# Patient Record
Sex: MALE | Race: BLACK OR AFRICAN AMERICAN | NOT HISPANIC OR LATINO
[De-identification: names, ages, dates, MRNs, and addresses within clinical notes are randomized per-mention and may not be internally consistent; named-entity substitution may affect disease eponyms.]

---

## 2019-06-28 PROBLEM — Z00.00 ENCOUNTER FOR PREVENTIVE HEALTH EXAMINATION: Status: ACTIVE | Noted: 2019-06-28

## 2021-02-05 ENCOUNTER — APPOINTMENT (OUTPATIENT)
Dept: PULMONOLOGY | Facility: CLINIC | Age: 51
End: 2021-02-05
Payer: COMMERCIAL

## 2021-02-05 VITALS
TEMPERATURE: 98.1 F | SYSTOLIC BLOOD PRESSURE: 116 MMHG | OXYGEN SATURATION: 97 % | DIASTOLIC BLOOD PRESSURE: 81 MMHG | WEIGHT: 214.13 LBS | BODY MASS INDEX: 31.72 KG/M2 | HEIGHT: 69 IN | HEART RATE: 82 BPM

## 2021-02-05 DIAGNOSIS — F17.200 NICOTINE DEPENDENCE, UNSPECIFIED, UNCOMPLICATED: ICD-10-CM

## 2021-02-05 DIAGNOSIS — F51.12 INSUFFICIENT SLEEP SYNDROME: ICD-10-CM

## 2021-02-05 DIAGNOSIS — K21.9 GASTRO-ESOPHAGEAL REFLUX DISEASE W/OUT ESOPHAGITIS: ICD-10-CM

## 2021-02-05 DIAGNOSIS — F17.210 NICOTINE DEPENDENCE, CIGARETTES, UNCOMPLICATED: ICD-10-CM

## 2021-02-05 PROCEDURE — 99406 BEHAV CHNG SMOKING 3-10 MIN: CPT

## 2021-02-05 PROCEDURE — 99072 ADDL SUPL MATRL&STAF TM PHE: CPT

## 2021-02-05 PROCEDURE — 99204 OFFICE O/P NEW MOD 45 MIN: CPT | Mod: 25

## 2021-02-05 RX ORDER — OMEPRAZOLE MAGNESIUM 20 MG/1
20 CAPSULE, DELAYED RELEASE ORAL
Refills: 0 | Status: ACTIVE | COMMUNITY

## 2021-02-05 NOTE — HISTORY OF PRESENT ILLNESS
[FreeTextEntry1] : 02/05/2021 :  ISAIAH ESCOBAR is a 50 year male who is here for evaluation of sleep apnea.  Tells me first dx'ed with obstructive sleep apnea about 15 yrs ago, on CPAP since, two further studies, CPAP reset.  Current CPAP about 2 years old. Currently tells me he is not feeling as refreshed in AM as he had been, family tells him he is snoring.  Very dificult sleep schedule, works long shifts with long commmute, generally sleeps 11 pm to 430 am at best, one wake.   for [employer]. Frequent AM headache, wakes with nasal congestion. \par \par Has had ENT surgery for nasal congestion, uses fluticasone sometimes, starting saline lavage.  Since first dx'ed lost about 15 lbs. \par \par Smokes 1 p/d since age 14.  Denies wheeze, dyspnea, cough.  Had  bronchitis in past, not for >10 yrs.

## 2021-02-05 NOTE — ASSESSMENT
[FreeTextEntry1] : obstructive sleep apnea by hx\par sleep deprived\par smoker\par chronic rhinitis\par \par Will get download from durable medical equipment provider ([facility])- likely inadequate rx based on observed snoring.  Also likely excessive daytime somnolence from insufficient sleep time. \par \par >30 pk yr hx but below usual age for lung ca screening, no sx.  Smoking cessation discussed at length with patient.  Options include nicotine replacement with patch, gum or lozenge or use of medication to reduce cravings (Chantix). Smoking cessation programs may be useful. \par \par Rx of chronic rhinitis may help: quit smoking, use nasal steroids, saline lavage\par \par Will f/u when get download from durable medical equipment provider

## 2021-02-05 NOTE — PHYSICAL EXAM
[General Appearance - Well Developed] : well developed [Normal Appearance] : normal appearance [Well Groomed] : well groomed [General Appearance - Well Nourished] : well nourished [No Deformities] : no deformities [General Appearance - In No Acute Distress] : no acute distress [Normal Conjunctiva] : the conjunctiva exhibited no abnormalities [Eyelids - No Xanthelasma] : the eyelids demonstrated no xanthelasmas [Low Lying Soft Palate] : low lying soft palate [Erythema] : erythema of the pharynx [IV] : IV [FreeTextEntry1] : crowded, edematous appearing uvula [Neck Appearance] : the appearance of the neck was normal [Neck Cervical Mass (___cm)] : no neck mass was observed [Jugular Venous Distention Increased] : there was no jugular-venous distention [Thyroid Diffuse Enlargement] : the thyroid was not enlarged [Thyroid Nodule] : there were no palpable thyroid nodules [Heart Rate And Rhythm] : heart rate was normal and rhythm regular [Heart Sounds] : normal S1 and S2 [Heart Sounds Gallop] : no gallops [Murmurs] : no murmurs [Heart Sounds Pericardial Friction Rub] : no pericardial rub [Auscultation Breath Sounds / Voice Sounds] : lungs were clear to auscultation bilaterally [Abnormal Walk] : normal gait [Musculoskeletal - Swelling] : no joint swelling seen [Motor Tone] : muscle strength and tone were normal [Nail Clubbing] : no clubbing of the fingernails [Cyanosis, Localized] : no localized cyanosis [Petechial Hemorrhages (___cm)] : no petechial hemorrhages [Skin Color & Pigmentation] : normal skin color and pigmentation [Skin Turgor] : normal skin turgor [] : no rash [Oriented To Time, Place, And Person] : oriented to person, place, and time [Impaired Insight] : insight and judgment were intact [Affect] : the affect was normal

## 2021-02-07 ENCOUNTER — FORM ENCOUNTER (OUTPATIENT)
Age: 51
End: 2021-02-07

## 2021-02-19 ENCOUNTER — APPOINTMENT (OUTPATIENT)
Dept: PULMONOLOGY | Facility: CLINIC | Age: 51
End: 2021-02-19
Payer: COMMERCIAL

## 2021-02-19 ENCOUNTER — APPOINTMENT (OUTPATIENT)
Dept: PULMONOLOGY | Facility: CLINIC | Age: 51
End: 2021-02-19

## 2021-02-19 DIAGNOSIS — G47.33 OBSTRUCTIVE SLEEP APNEA (ADULT) (PEDIATRIC): ICD-10-CM

## 2021-02-19 DIAGNOSIS — J31.0 CHRONIC RHINITIS: ICD-10-CM

## 2021-02-19 PROCEDURE — 99213 OFFICE O/P EST LOW 20 MIN: CPT | Mod: 95

## 2021-02-19 NOTE — ASSESSMENT
[FreeTextEntry1] : download shows variable but often  inadequate treatment on fixed pressure. Have sent rx to durable medical equipment provider ([facility]) to change to auto PAP if possible, f/u one month.  also advised him to restart fluticasone nasal spray for at least next month.  Might benefit from ENT eval

## 2021-02-19 NOTE — REASON FOR VISIT
[Home] : at home, [unfilled] , at the time of the visit. [Medical Office: ([facility])___] : at the medical office located in  [Verbal consent obtained from patient] : the patient, [unfilled]

## 2021-02-19 NOTE — HISTORY OF PRESENT ILLNESS
[FreeTextEntry1] : 02/05/2021 :  ISAIAH ESCOBAR is a 50 year male who is here for evaluation of sleep apnea.  Tells me first dx'ed with obstructive sleep apnea about 15 yrs ago, on CPAP since, two further studies, CPAP reset.  Current CPAP about 2 years old. Currently tells me he is not feeling as refreshed in AM as he had been, family tells him he is snoring.  Very dificult sleep schedule, works long shifts with long commmute, generally sleeps 11 pm to 430 am at best, one wake.   for [employer]. Frequent AM headache, wakes with nasal congestion. \par \par Has had ENT surgery for nasal congestion, uses fluticasone sometimes, starting saline lavage.  Since first dx'ed lost about 15 lbs. \par \par Smokes 1 p/d since age 14.  Denies wheeze, dyspnea, cough.  Had  bronchitis in past, not for >10 yrs. \par \par 2/19/21:  reviewed download from CPAP for past 30 d- compliance fair, 21/30 d, average Apnea Hypopnea Index is 15.3 with CPAP set at 9.5.   Most days good, some days AHI high 20s.  Patient relates this to days when he has bad nasal congestion.

## 2021-04-09 ENCOUNTER — APPOINTMENT (OUTPATIENT)
Dept: PULMONOLOGY | Facility: CLINIC | Age: 51
End: 2021-04-09

## 2021-06-01 ENCOUNTER — TRANSCRIPTION ENCOUNTER (OUTPATIENT)
Age: 51
End: 2021-06-01

## 2021-06-22 ENCOUNTER — NON-APPOINTMENT (OUTPATIENT)
Age: 51
End: 2021-06-22

## 2023-03-28 ENCOUNTER — APPOINTMENT (OUTPATIENT)
Dept: PULMONOLOGY | Facility: CLINIC | Age: 53
End: 2023-03-28

## 2024-03-22 ENCOUNTER — EMERGENCY (EMERGENCY)
Facility: HOSPITAL | Age: 54
LOS: 1 days | Discharge: HOME | End: 2024-03-22
Attending: EMERGENCY MEDICINE | Admitting: EMERGENCY MEDICINE
Payer: COMMERCIAL

## 2024-03-22 VITALS
HEART RATE: 86 BPM | DIASTOLIC BLOOD PRESSURE: 86 MMHG | OXYGEN SATURATION: 95 % | TEMPERATURE: 99 F | SYSTOLIC BLOOD PRESSURE: 132 MMHG | RESPIRATION RATE: 18 BRPM

## 2024-03-22 LAB
ALBUMIN SERPL ELPH-MCNC: 4.5 G/DL — SIGNIFICANT CHANGE UP (ref 3.3–5)
ALP SERPL-CCNC: 79 U/L — SIGNIFICANT CHANGE UP (ref 40–120)
ALT FLD-CCNC: 32 U/L — SIGNIFICANT CHANGE UP (ref 4–41)
ANION GAP SERPL CALC-SCNC: 11 MMOL/L — SIGNIFICANT CHANGE UP (ref 7–14)
AST SERPL-CCNC: 25 U/L — SIGNIFICANT CHANGE UP (ref 4–40)
BASOPHILS # BLD AUTO: 0.05 K/UL — SIGNIFICANT CHANGE UP (ref 0–0.2)
BASOPHILS NFR BLD AUTO: 0.7 % — SIGNIFICANT CHANGE UP (ref 0–2)
BILIRUB SERPL-MCNC: 0.4 MG/DL — SIGNIFICANT CHANGE UP (ref 0.2–1.2)
BUN SERPL-MCNC: 15 MG/DL — SIGNIFICANT CHANGE UP (ref 7–23)
CALCIUM SERPL-MCNC: 9.6 MG/DL — SIGNIFICANT CHANGE UP (ref 8.4–10.5)
CHLORIDE SERPL-SCNC: 102 MMOL/L — SIGNIFICANT CHANGE UP (ref 98–107)
CO2 SERPL-SCNC: 25 MMOL/L — SIGNIFICANT CHANGE UP (ref 22–31)
CREAT SERPL-MCNC: 0.96 MG/DL — SIGNIFICANT CHANGE UP (ref 0.5–1.3)
D DIMER BLD IA.RAPID-MCNC: <150 NG/ML DDU — SIGNIFICANT CHANGE UP
EGFR: 93 ML/MIN/1.73M2 — SIGNIFICANT CHANGE UP
EOSINOPHIL # BLD AUTO: 0.39 K/UL — SIGNIFICANT CHANGE UP (ref 0–0.5)
EOSINOPHIL NFR BLD AUTO: 5.2 % — SIGNIFICANT CHANGE UP (ref 0–6)
FLUAV AG NPH QL: SIGNIFICANT CHANGE UP
FLUBV AG NPH QL: SIGNIFICANT CHANGE UP
GLUCOSE SERPL-MCNC: 108 MG/DL — HIGH (ref 70–99)
HCT VFR BLD CALC: 44.8 % — SIGNIFICANT CHANGE UP (ref 39–50)
HGB BLD-MCNC: 15.7 G/DL — SIGNIFICANT CHANGE UP (ref 13–17)
IANC: 4.88 K/UL — SIGNIFICANT CHANGE UP (ref 1.8–7.4)
IMM GRANULOCYTES NFR BLD AUTO: 0.3 % — SIGNIFICANT CHANGE UP (ref 0–0.9)
LYMPHOCYTES # BLD AUTO: 1.37 K/UL — SIGNIFICANT CHANGE UP (ref 1–3.3)
LYMPHOCYTES # BLD AUTO: 18.4 % — SIGNIFICANT CHANGE UP (ref 13–44)
MCHC RBC-ENTMCNC: 29.2 PG — SIGNIFICANT CHANGE UP (ref 27–34)
MCHC RBC-ENTMCNC: 35 GM/DL — SIGNIFICANT CHANGE UP (ref 32–36)
MCV RBC AUTO: 83.3 FL — SIGNIFICANT CHANGE UP (ref 80–100)
MONOCYTES # BLD AUTO: 0.74 K/UL — SIGNIFICANT CHANGE UP (ref 0–0.9)
MONOCYTES NFR BLD AUTO: 9.9 % — SIGNIFICANT CHANGE UP (ref 2–14)
NEUTROPHILS # BLD AUTO: 4.88 K/UL — SIGNIFICANT CHANGE UP (ref 1.8–7.4)
NEUTROPHILS NFR BLD AUTO: 65.5 % — SIGNIFICANT CHANGE UP (ref 43–77)
NRBC # BLD: 0 /100 WBCS — SIGNIFICANT CHANGE UP (ref 0–0)
NRBC # FLD: 0.02 K/UL — HIGH (ref 0–0)
PLATELET # BLD AUTO: 165 K/UL — SIGNIFICANT CHANGE UP (ref 150–400)
POTASSIUM SERPL-MCNC: 4.2 MMOL/L — SIGNIFICANT CHANGE UP (ref 3.5–5.3)
POTASSIUM SERPL-SCNC: 4.2 MMOL/L — SIGNIFICANT CHANGE UP (ref 3.5–5.3)
PROT SERPL-MCNC: 7.5 G/DL — SIGNIFICANT CHANGE UP (ref 6–8.3)
RBC # BLD: 5.38 M/UL — SIGNIFICANT CHANGE UP (ref 4.2–5.8)
RBC # FLD: 13.2 % — SIGNIFICANT CHANGE UP (ref 10.3–14.5)
RSV RNA NPH QL NAA+NON-PROBE: SIGNIFICANT CHANGE UP
SARS-COV-2 RNA SPEC QL NAA+PROBE: SIGNIFICANT CHANGE UP
SODIUM SERPL-SCNC: 138 MMOL/L — SIGNIFICANT CHANGE UP (ref 135–145)
TROPONIN T, HIGH SENSITIVITY RESULT: 10 NG/L — SIGNIFICANT CHANGE UP
WBC # BLD: 7.45 K/UL — SIGNIFICANT CHANGE UP (ref 3.8–10.5)
WBC # FLD AUTO: 7.45 K/UL — SIGNIFICANT CHANGE UP (ref 3.8–10.5)

## 2024-03-22 PROCEDURE — 71046 X-RAY EXAM CHEST 2 VIEWS: CPT | Mod: 26

## 2024-03-22 PROCEDURE — 99284 EMERGENCY DEPT VISIT MOD MDM: CPT

## 2024-03-22 PROCEDURE — 93010 ELECTROCARDIOGRAM REPORT: CPT

## 2024-03-22 RX ORDER — AZITHROMYCIN 500 MG/1
500 TABLET, FILM COATED ORAL ONCE
Refills: 0 | Status: COMPLETED | OUTPATIENT
Start: 2024-03-22 | End: 2024-03-22

## 2024-03-22 RX ORDER — AZITHROMYCIN 500 MG/1
1 TABLET, FILM COATED ORAL
Qty: 4 | Refills: 0
Start: 2024-03-22 | End: 2024-03-25

## 2024-03-22 RX ORDER — IPRATROPIUM/ALBUTEROL SULFATE 18-103MCG
3 AEROSOL WITH ADAPTER (GRAM) INHALATION ONCE
Refills: 0 | Status: COMPLETED | OUTPATIENT
Start: 2024-03-22 | End: 2024-03-22

## 2024-03-22 RX ORDER — FLUTICASONE PROPIONATE 50 MCG
1 SPRAY, SUSPENSION NASAL ONCE
Refills: 0 | Status: DISCONTINUED | OUTPATIENT
Start: 2024-03-22 | End: 2024-03-26

## 2024-03-22 RX ADMIN — Medication 3 MILLILITER(S): at 13:35

## 2024-03-22 RX ADMIN — AZITHROMYCIN 500 MILLIGRAM(S): 500 TABLET, FILM COATED ORAL at 15:45

## 2024-03-22 NOTE — ED ADULT NURSE NOTE - NSFALLUNIVINTERV_ED_ALL_ED
Bed/Stretcher in lowest position, wheels locked, appropriate side rails in place/Call bell, personal items and telephone in reach/Instruct patient to call for assistance before getting out of bed/chair/stretcher/Non-slip footwear applied when patient is off stretcher/[place] to call system/Physically safe environment - no spills, clutter or unnecessary equipment/Purposeful proactive rounding/Room/bathroom lighting operational, light cord in reach

## 2024-03-22 NOTE — ED PROVIDER NOTE - OBJECTIVE STATEMENT
The patient is a 54 y/o M with past medical history of GERD, DELANEY on CPAP presenting with 1 week of cough, nasal congestion, headaches, and chest pain. States the chest pain is mostly present while he is coughing and states it feels like a tightness in his chest. Also endorsing increased dyspnea at rest and feels like his nightly CPAP is not helping. Bringing up small amount of white phlegm with the cough. Has been taking motrin which provides mild relief of the headache and chest tightness, last took last night. Endorses smoking 1 ppd of cigarettes. Notes the symptoms feel similar to prior occurrences of bronchitis for which duonebs  have helped in the past. Denies any fever, chills, abdominal pain, nausea, vomiting, diarrhea, or any other symptoms.

## 2024-03-22 NOTE — ED ADULT NURSE NOTE - NSFALLRISKASMT_ED_ALL_ED_DT
Call added to anticoag encounter.   Hazard triage, please address pt question about covid vaccine as below.   Thank you     22-Mar-2024 13:59

## 2024-03-22 NOTE — ED ADULT NURSE NOTE - OBJECTIVE STATEMENT
Patient is a 52 yo male, phx GERD, presenting with cough and chest pain x 1 week. A&Ox4, no signs of respiratory distress, lung sounds clear, duoneb in progress. Reports chest pain only when coughing. Denies fever. 20G PIV placed to RFA, labs sent per orders. Pending CXR. Fall precautions maintained.

## 2024-03-22 NOTE — ED PROVIDER NOTE - IV ALTEPLASE DOOR HIDDEN
Will continue current insulin regimen for now. Will continue monitoring  blood sugars, will Follow up. show

## 2024-03-22 NOTE — ED PROVIDER NOTE - NSFOLLOWUPINSTRUCTIONS_ED_ALL_ED_FT
Please take the antibiotic sent to your pharmacy as prescribed.     Take motrin and/or tylenol as needed for the symptoms.     Use flonase as needed for symptoms.    Follow up with your primary care provider within the next week.      Upper Respiratory Infection    A respiratory infection is an illness that affects parts of the body used for breathing, like the lungs, nose, and throat. Symptoms can include runny nose, coughing, sneezing, fatigue, body aches, sore throat, fever, or headache. Over the counter medicine can be used to manage the symptoms but the infection typically goes away on its own in 5 to 10 days.     SEEK IMMEDIATE MEDICAL CARE IF YOU HAVE ANY OF THE FOLLOWING SYMPTOMS: severe shortness of breath, chest pain, fever over 10 days, or lightheadedness/dizziness.

## 2024-03-22 NOTE — ED ADULT TRIAGE NOTE - CHIEF COMPLAINT QUOTE
pt with co cough /congestion ans discomfort to chest with coughing x one week. Pt with no fever in triage.

## 2024-03-22 NOTE — ED PROVIDER NOTE - CLINICAL SUMMARY MEDICAL DECISION MAKING FREE TEXT BOX
FERMÍN Perkins PGY1- patient here with 1 week cough, congestion, chest tightness, headaches, mild dyspnea at rest, feels like prior episodes of bronchitis, mild relief with motrin; feels nightly CPAP not helping dyspnea. Vitals WNL, diffuse rhonchi on exam, possible expiratory wheezing though patient coughing during expiratory phase so somewhat difficult to tell. ddx including viral uri, pneumonia, bronchitis, less likely but still consider ACS. Will obtain labs including troponin, ekg, CXR, nasal swab, give duoneb for symptoms and reassess.

## 2024-03-22 NOTE — ED PROVIDER NOTE - PATIENT PORTAL LINK FT
You can access the FollowMyHealth Patient Portal offered by [facility] by registering at the following website: [email or web address]. By joining [facility]’s FollowMyHealth portal, you will also be able to view your health information using other applications (apps) compatible with our system.

## 2024-03-22 NOTE — ED PROVIDER NOTE - PHYSICAL EXAMINATION
General: no acute distress  Psych: mood appropriate  Head: normocephalic; atraumatic  Eyes: conjunctivae clear bilaterally, sclerae anicteric  ENT: no nasal flaring, patent nares  Cardio: regular rate and rhythm; normal heart sounds  Resp: diffuse rhonchi, ?expiratory wheezing (unclear as patient coughing during expiration); no tachypnea or increased work of breathing  GI: abdomen soft, nontender, nondistended  Neuro: A&Ox3  Skin: no rashes or bruising noted  MSK: normal movement of extremities  Lymph/Vasc: no LE edema

## 2024-03-22 NOTE — ED PROVIDER NOTE - PROGRESS NOTE DETAILS
FERMÍN Perkins PGY1- no wheezing after duoneb, patient with some relief. Vitals WNL, will order dose of azithromycin here, send rest to pharmacy and give flonase. NM home

## 2024-03-22 NOTE — ED PROVIDER NOTE - ATTENDING CONTRIBUTION TO CARE
53M with h/o DELANEY on CPAP, smoking (1 pack of cigarettes per day), GERD p/w URI symptoms including cough, nasal congestion, chest pain and SOB x few days, now with episode of acute SOB when trying to sleep with CPAP machine last night. Pt was with his daughter this past weekend, who also had URI symptoms. Pt denies fever, abd pain, n/v/c/d, BLE pain/swelling, or other complaints. On exam, he has rhonchi throughout the lung fields. He is well-appearing without increased WOB or acute respiratory distress. No focal neuro deficits.    DDX/PLAN: Ddx includes PNA, viral syndrome, bronchitis, ACS, with lower suspicion for PE or dissection. Will obtain troponin, EKG, CXR, labs and give nebulizer treatment. Dispo pending workup.

## 2025-09-05 ENCOUNTER — EMERGENCY (EMERGENCY)
Facility: HOSPITAL | Age: 55
LOS: 1 days | End: 2025-09-05
Attending: PERSONAL EMERGENCY RESPONSE ATTENDANT | Admitting: PERSONAL EMERGENCY RESPONSE ATTENDANT
Payer: COMMERCIAL

## 2025-09-05 VITALS
RESPIRATION RATE: 18 BRPM | SYSTOLIC BLOOD PRESSURE: 122 MMHG | OXYGEN SATURATION: 99 % | HEIGHT: 69 IN | DIASTOLIC BLOOD PRESSURE: 80 MMHG | WEIGHT: 210.1 LBS | TEMPERATURE: 98 F | HEART RATE: 85 BPM

## 2025-09-05 LAB
ALBUMIN SERPL ELPH-MCNC: 4.2 G/DL — SIGNIFICANT CHANGE UP (ref 3.3–5)
ALP SERPL-CCNC: 82 U/L — SIGNIFICANT CHANGE UP (ref 40–120)
ALT FLD-CCNC: 30 U/L — SIGNIFICANT CHANGE UP (ref 4–41)
ANION GAP SERPL CALC-SCNC: 12 MMOL/L — SIGNIFICANT CHANGE UP (ref 7–14)
AST SERPL-CCNC: 23 U/L — SIGNIFICANT CHANGE UP (ref 4–40)
BASOPHILS # BLD AUTO: 0.06 K/UL — SIGNIFICANT CHANGE UP (ref 0–0.2)
BASOPHILS NFR BLD AUTO: 0.6 % — SIGNIFICANT CHANGE UP (ref 0–2)
BILIRUB SERPL-MCNC: 0.4 MG/DL — SIGNIFICANT CHANGE UP (ref 0.2–1.2)
BUN SERPL-MCNC: 12 MG/DL — SIGNIFICANT CHANGE UP (ref 7–23)
CALCIUM SERPL-MCNC: 9.6 MG/DL — SIGNIFICANT CHANGE UP (ref 8.4–10.5)
CHLORIDE SERPL-SCNC: 100 MMOL/L — SIGNIFICANT CHANGE UP (ref 98–107)
CO2 SERPL-SCNC: 26 MMOL/L — SIGNIFICANT CHANGE UP (ref 22–31)
CREAT SERPL-MCNC: 1.12 MG/DL — SIGNIFICANT CHANGE UP (ref 0.5–1.3)
EGFR: 77 ML/MIN/1.73M2 — SIGNIFICANT CHANGE UP
EGFR: 77 ML/MIN/1.73M2 — SIGNIFICANT CHANGE UP
EOSINOPHIL # BLD AUTO: 0.33 K/UL — SIGNIFICANT CHANGE UP (ref 0–0.5)
EOSINOPHIL NFR BLD AUTO: 3.3 % — SIGNIFICANT CHANGE UP (ref 0–6)
GLUCOSE SERPL-MCNC: 91 MG/DL — SIGNIFICANT CHANGE UP (ref 70–99)
HCT VFR BLD CALC: 46.1 % — SIGNIFICANT CHANGE UP (ref 39–50)
HGB BLD-MCNC: 15.9 G/DL — SIGNIFICANT CHANGE UP (ref 13–17)
IMM GRANULOCYTES # BLD AUTO: 0.03 K/UL — SIGNIFICANT CHANGE UP (ref 0–0.07)
IMM GRANULOCYTES NFR BLD AUTO: 0.3 % — SIGNIFICANT CHANGE UP (ref 0–0.9)
LYMPHOCYTES # BLD AUTO: 1.78 K/UL — SIGNIFICANT CHANGE UP (ref 1–3.3)
LYMPHOCYTES NFR BLD AUTO: 17.8 % — SIGNIFICANT CHANGE UP (ref 13–44)
MCHC RBC-ENTMCNC: 29.3 PG — SIGNIFICANT CHANGE UP (ref 27–34)
MCHC RBC-ENTMCNC: 34.5 G/DL — SIGNIFICANT CHANGE UP (ref 32–36)
MCV RBC AUTO: 85.1 FL — SIGNIFICANT CHANGE UP (ref 80–100)
MONOCYTES # BLD AUTO: 0.97 K/UL — HIGH (ref 0–0.9)
MONOCYTES NFR BLD AUTO: 9.7 % — SIGNIFICANT CHANGE UP (ref 2–14)
NEUTROPHILS # BLD AUTO: 6.84 K/UL — SIGNIFICANT CHANGE UP (ref 1.8–7.4)
NEUTROPHILS NFR BLD AUTO: 68.3 % — SIGNIFICANT CHANGE UP (ref 43–77)
NRBC # BLD AUTO: 0 K/UL — SIGNIFICANT CHANGE UP (ref 0–0)
NRBC # FLD: 0 K/UL — SIGNIFICANT CHANGE UP (ref 0–0)
NRBC BLD AUTO-RTO: 0 /100 WBCS — SIGNIFICANT CHANGE UP (ref 0–0)
PLATELET # BLD AUTO: 176 K/UL — SIGNIFICANT CHANGE UP (ref 150–400)
PMV BLD: 10.6 FL — SIGNIFICANT CHANGE UP (ref 7–13)
POTASSIUM SERPL-MCNC: 4.2 MMOL/L — SIGNIFICANT CHANGE UP (ref 3.5–5.3)
POTASSIUM SERPL-SCNC: 4.2 MMOL/L — SIGNIFICANT CHANGE UP (ref 3.5–5.3)
PROT SERPL-MCNC: 7.2 G/DL — SIGNIFICANT CHANGE UP (ref 6–8.3)
RBC # BLD: 5.42 M/UL — SIGNIFICANT CHANGE UP (ref 4.2–5.8)
RBC # FLD: 13.3 % — SIGNIFICANT CHANGE UP (ref 10.3–14.5)
SODIUM SERPL-SCNC: 138 MMOL/L — SIGNIFICANT CHANGE UP (ref 135–145)
WBC # BLD: 10.01 K/UL — SIGNIFICANT CHANGE UP (ref 3.8–10.5)
WBC # FLD AUTO: 10.01 K/UL — SIGNIFICANT CHANGE UP (ref 3.8–10.5)

## 2025-09-05 PROCEDURE — 10080 I&D PILONIDAL CYST SIMPLE: CPT

## 2025-09-05 PROCEDURE — 99285 EMERGENCY DEPT VISIT HI MDM: CPT | Mod: 25

## 2025-09-05 RX ORDER — KETOROLAC TROMETHAMINE 30 MG/ML
30 INJECTION, SOLUTION INTRAMUSCULAR; INTRAVENOUS ONCE
Refills: 0 | Status: DISCONTINUED | OUTPATIENT
Start: 2025-09-05 | End: 2025-09-05

## 2025-09-05 RX ADMIN — KETOROLAC TROMETHAMINE 30 MILLIGRAM(S): 30 INJECTION, SOLUTION INTRAMUSCULAR; INTRAVENOUS at 20:51

## 2025-09-06 VITALS
SYSTOLIC BLOOD PRESSURE: 136 MMHG | OXYGEN SATURATION: 97 % | RESPIRATION RATE: 17 BRPM | DIASTOLIC BLOOD PRESSURE: 90 MMHG | HEART RATE: 75 BPM | TEMPERATURE: 98 F

## 2025-09-06 PROBLEM — K21.9 GASTRO-ESOPHAGEAL REFLUX DISEASE WITHOUT ESOPHAGITIS: Chronic | Status: ACTIVE | Noted: 2024-03-22

## 2025-09-06 PROCEDURE — 74177 CT ABD & PELVIS W/CONTRAST: CPT | Mod: 26

## 2025-09-06 RX ORDER — CEPHALEXIN 250 MG/1
1 CAPSULE ORAL
Qty: 21 | Refills: 0
Start: 2025-09-06 | End: 2025-09-12

## 2025-09-06 RX ORDER — CEPHALEXIN 250 MG/1
500 CAPSULE ORAL ONCE
Refills: 0 | Status: COMPLETED | OUTPATIENT
Start: 2025-09-06 | End: 2025-09-06

## 2025-09-06 RX ADMIN — CEPHALEXIN 500 MILLIGRAM(S): 250 CAPSULE ORAL at 02:24

## 2025-09-11 ENCOUNTER — NON-APPOINTMENT (OUTPATIENT)
Age: 55
End: 2025-09-11

## 2025-09-12 ENCOUNTER — APPOINTMENT (OUTPATIENT)
Dept: SURGERY | Facility: CLINIC | Age: 55
End: 2025-09-12
Payer: COMMERCIAL

## 2025-09-12 VITALS
DIASTOLIC BLOOD PRESSURE: 68 MMHG | HEIGHT: 69 IN | RESPIRATION RATE: 16 BRPM | BODY MASS INDEX: 31.7 KG/M2 | HEART RATE: 80 BPM | OXYGEN SATURATION: 98 % | TEMPERATURE: 97.8 F | SYSTOLIC BLOOD PRESSURE: 115 MMHG | WEIGHT: 214 LBS

## 2025-09-12 PROCEDURE — 99204 OFFICE O/P NEW MOD 45 MIN: CPT
